# Patient Record
Sex: FEMALE | Race: WHITE | NOT HISPANIC OR LATINO | Employment: OTHER | URBAN - METROPOLITAN AREA
[De-identification: names, ages, dates, MRNs, and addresses within clinical notes are randomized per-mention and may not be internally consistent; named-entity substitution may affect disease eponyms.]

---

## 2018-05-25 ENCOUNTER — APPOINTMENT (EMERGENCY)
Dept: CT IMAGING | Facility: HOSPITAL | Age: 68
End: 2018-05-25
Payer: MEDICARE

## 2018-05-25 ENCOUNTER — HOSPITAL ENCOUNTER (EMERGENCY)
Facility: HOSPITAL | Age: 68
Discharge: LEFT BEFORE DISCHARGE | End: 2018-05-25
Attending: EMERGENCY MEDICINE
Payer: MEDICARE

## 2018-05-25 ENCOUNTER — APPOINTMENT (EMERGENCY)
Dept: RADIOLOGY | Facility: HOSPITAL | Age: 68
End: 2018-05-25
Payer: MEDICARE

## 2018-05-25 VITALS
OXYGEN SATURATION: 98 % | HEART RATE: 82 BPM | SYSTOLIC BLOOD PRESSURE: 114 MMHG | WEIGHT: 165 LBS | HEIGHT: 64 IN | RESPIRATION RATE: 16 BRPM | DIASTOLIC BLOOD PRESSURE: 56 MMHG | BODY MASS INDEX: 28.17 KG/M2 | TEMPERATURE: 97.9 F

## 2018-05-25 DIAGNOSIS — R55 SYNCOPE AND COLLAPSE: Primary | ICD-10-CM

## 2018-05-25 DIAGNOSIS — R42 DIZZINESS: ICD-10-CM

## 2018-05-25 DIAGNOSIS — N17.9 AKI (ACUTE KIDNEY INJURY) (HCC): ICD-10-CM

## 2018-05-25 LAB
ALBUMIN SERPL BCP-MCNC: 3.8 G/DL (ref 3.5–5)
ALP SERPL-CCNC: 51 U/L (ref 46–116)
ALT SERPL W P-5'-P-CCNC: 25 U/L (ref 12–78)
ANION GAP SERPL CALCULATED.3IONS-SCNC: 9 MMOL/L (ref 4–13)
APTT PPP: 31 SECONDS (ref 24–36)
AST SERPL W P-5'-P-CCNC: 17 U/L (ref 5–45)
ATRIAL RATE: 89 BPM
BASOPHILS # BLD AUTO: 0.05 THOUSANDS/ΜL (ref 0–0.1)
BASOPHILS NFR BLD AUTO: 1 % (ref 0–1)
BILIRUB SERPL-MCNC: 0.9 MG/DL (ref 0.2–1)
BUN SERPL-MCNC: 19 MG/DL (ref 5–25)
CALCIUM SERPL-MCNC: 9.8 MG/DL (ref 8.3–10.1)
CHLORIDE SERPL-SCNC: 102 MMOL/L (ref 100–108)
CO2 SERPL-SCNC: 29 MMOL/L (ref 21–32)
CREAT SERPL-MCNC: 1.34 MG/DL (ref 0.6–1.3)
EOSINOPHIL # BLD AUTO: 0.12 THOUSAND/ΜL (ref 0–0.61)
EOSINOPHIL NFR BLD AUTO: 2 % (ref 0–6)
ERYTHROCYTE [DISTWIDTH] IN BLOOD BY AUTOMATED COUNT: 13.2 % (ref 11.6–15.1)
GFR SERPL CREATININE-BSD FRML MDRD: 41 ML/MIN/1.73SQ M
GLUCOSE SERPL-MCNC: 117 MG/DL (ref 65–140)
HCT VFR BLD AUTO: 42.4 % (ref 34.8–46.1)
HGB BLD-MCNC: 13.7 G/DL (ref 11.5–15.4)
IMM GRANULOCYTES # BLD AUTO: 0.02 THOUSAND/UL (ref 0–0.2)
IMM GRANULOCYTES NFR BLD AUTO: 0 % (ref 0–2)
INR PPP: 1.04 (ref 0.86–1.17)
LYMPHOCYTES # BLD AUTO: 0.84 THOUSANDS/ΜL (ref 0.6–4.47)
LYMPHOCYTES NFR BLD AUTO: 13 % (ref 14–44)
MAGNESIUM SERPL-MCNC: 2 MG/DL (ref 1.6–2.6)
MCH RBC QN AUTO: 27.7 PG (ref 26.8–34.3)
MCHC RBC AUTO-ENTMCNC: 32.3 G/DL (ref 31.4–37.4)
MCV RBC AUTO: 86 FL (ref 82–98)
MONOCYTES # BLD AUTO: 0.63 THOUSAND/ΜL (ref 0.17–1.22)
MONOCYTES NFR BLD AUTO: 9 % (ref 4–12)
NEUTROPHILS # BLD AUTO: 5.04 THOUSANDS/ΜL (ref 1.85–7.62)
NEUTS SEG NFR BLD AUTO: 75 % (ref 43–75)
NRBC BLD AUTO-RTO: 0 /100 WBCS
P AXIS: 74 DEGREES
PLATELET # BLD AUTO: 262 THOUSANDS/UL (ref 149–390)
PMV BLD AUTO: 9.4 FL (ref 8.9–12.7)
POTASSIUM SERPL-SCNC: 3.7 MMOL/L (ref 3.5–5.3)
PR INTERVAL: 168 MS
PROT SERPL-MCNC: 7.2 G/DL (ref 6.4–8.2)
PROTHROMBIN TIME: 13.5 SECONDS (ref 11.8–14.2)
QRS AXIS: -53 DEGREES
QRSD INTERVAL: 96 MS
QT INTERVAL: 382 MS
QTC INTERVAL: 464 MS
RBC # BLD AUTO: 4.95 MILLION/UL (ref 3.81–5.12)
SODIUM SERPL-SCNC: 140 MMOL/L (ref 136–145)
T WAVE AXIS: 62 DEGREES
TROPONIN I SERPL-MCNC: <0.02 NG/ML
VENTRICULAR RATE: 89 BPM
WBC # BLD AUTO: 6.7 THOUSAND/UL (ref 4.31–10.16)

## 2018-05-25 PROCEDURE — 85610 PROTHROMBIN TIME: CPT | Performed by: EMERGENCY MEDICINE

## 2018-05-25 PROCEDURE — 99285 EMERGENCY DEPT VISIT HI MDM: CPT

## 2018-05-25 PROCEDURE — 70450 CT HEAD/BRAIN W/O DYE: CPT

## 2018-05-25 PROCEDURE — 36415 COLL VENOUS BLD VENIPUNCTURE: CPT | Performed by: EMERGENCY MEDICINE

## 2018-05-25 PROCEDURE — 85025 COMPLETE CBC W/AUTO DIFF WBC: CPT | Performed by: EMERGENCY MEDICINE

## 2018-05-25 PROCEDURE — 96360 HYDRATION IV INFUSION INIT: CPT

## 2018-05-25 PROCEDURE — 71046 X-RAY EXAM CHEST 2 VIEWS: CPT

## 2018-05-25 PROCEDURE — 83735 ASSAY OF MAGNESIUM: CPT | Performed by: EMERGENCY MEDICINE

## 2018-05-25 PROCEDURE — 80053 COMPREHEN METABOLIC PANEL: CPT | Performed by: EMERGENCY MEDICINE

## 2018-05-25 PROCEDURE — 85730 THROMBOPLASTIN TIME PARTIAL: CPT | Performed by: EMERGENCY MEDICINE

## 2018-05-25 PROCEDURE — 84484 ASSAY OF TROPONIN QUANT: CPT | Performed by: EMERGENCY MEDICINE

## 2018-05-25 PROCEDURE — 93010 ELECTROCARDIOGRAM REPORT: CPT | Performed by: INTERNAL MEDICINE

## 2018-05-25 PROCEDURE — 93005 ELECTROCARDIOGRAM TRACING: CPT

## 2018-05-25 RX ADMIN — SODIUM CHLORIDE 1000 ML: 0.9 INJECTION, SOLUTION INTRAVENOUS at 16:00

## 2018-05-25 NOTE — ED PROVIDER NOTES
History  Chief Complaint   Patient presents with    Syncope     Pt with episode of fainting earlier today, states her BP was low this morning at her PCP  Pt does think she hit the back of her head and is on blood thinners      Patient is a 42-year-old female with past medical history of prior stroke that left her with residual diplopia in 2003, FMD (facioscapulohumeral muscular dystrophy), hypertension, presents to the emergency department after she had a syncopal episode this afternoon around 12:30-1PM   Patient reports for several months she has been having dizzy spells which she describes as an off-balance feeling  She was seen by her neurologist today for a follow-up visit  This afternoon she ate lunch, had 1 glass of sangria and then when gross 3 shopping with her   They were in the checkout line and patient reports feeling dizzy both as a lightheaded feeling and an off-balance feeling and then syncopized   reports hearing a loud thud on the ground and found her on the floor lying on her back  By the time he came to her, her eyes were open and she was responding so her loss of consciousness could not be more than several seconds  She denies ever having a syncopal episode before  She states she still feels mildly dizzy and not quite back to baseline  She denies any headache, visual disturbance worse than baseline, tinnitus or change in hearing, neck pain or back pain, chest pain, palpitations, dyspnea, diaphoresis, abdominal pain, nausea, vomiting, diarrhea, constipation, blood per rectum or melena, recent dysuria, change in urinary frequency, hematuria, flank pain, skin rash or color change, leg pain or swelling, lateralizing extremity weakness or paresthesia, slurring of speech, difficulty getting words out, facial asymmetry or other focal neurologic deficits  She does follow with a cardiologist and reports she has an aneurysm" that she thinks is in her aorta    She denies prior dysrhythmia or MI  Patient reports several months ago she had a CT scan of her brain as part of the dizziness workup which was apparently normal  Her neurologist reports he is not sure what is causing her dizziness  Patient does note that recently her blood pressure has been running low around  systolic  She states normally her blood pressure is around 078-657 systolic  Her family doctor recently switched her blood pressure medications about 3-4 weeks ago and since then it has been running slightly lower than normal         History provided by:  Patient   used: No        None       Past Medical History:   Diagnosis Date    Cataracts, both eyes     FMD (facioscapulohumeral muscular dystrophy) ([facility] [address] [facility]     Stroke ([facility] )     2003       Past Surgical History:   Procedure Laterality Date    BACK SURGERY      TUBAL LIGATION         History reviewed  No pertinent family history  I have reviewed and agree with the history as documented  Social History   Substance Use Topics    Smoking status: Never Smoker    Smokeless tobacco: Never Used    Alcohol use No        Review of Systems   Constitutional: Negative for chills, diaphoresis, fatigue and fever  HENT: Negative for congestion, ear pain, hearing loss, rhinorrhea, sore throat and tinnitus  Eyes: Negative for photophobia, pain and visual disturbance  Respiratory: Negative for cough, chest tightness, shortness of breath and wheezing  Cardiovascular: Negative for chest pain, palpitations and leg swelling  Gastrointestinal: Negative for abdominal distention, abdominal pain, blood in stool, constipation, diarrhea, nausea and vomiting  Genitourinary: Negative for dysuria, flank pain, frequency and hematuria  Musculoskeletal: Negative for back pain, neck pain and neck stiffness  Skin: Negative for color change, pallor, rash and wound  Allergic/Immunologic: Negative for immunocompromised state     Neurological: Positive for dizziness, syncope and light-headedness  Negative for seizures, facial asymmetry, speech difficulty, weakness, numbness and headaches  Hematological: Negative for adenopathy  Does not bruise/bleed easily  Psychiatric/Behavioral: Negative for confusion and decreased concentration  All other systems reviewed and are negative  Physical Exam  Physical Exam   Constitutional: She is oriented to person, place, and time  She appears well-developed and well-nourished  No distress  HENT:   Head: Normocephalic and atraumatic  Right Ear: External ear normal    Left Ear: External ear normal    Mouth/Throat: Oropharynx is clear and moist  No oropharyngeal exudate  No scalp hematoma or laceration  Eyes: Conjunctivae and EOM are normal  Pupils are equal, round, and reactive to light  Neck: Normal range of motion  Neck supple  No JVD present  Cardiovascular: Normal rate, regular rhythm, normal heart sounds and intact distal pulses  Exam reveals no gallop and no friction rub  No murmur heard  Pulmonary/Chest: Effort normal and breath sounds normal  No respiratory distress  She has no wheezes  She has no rales  She exhibits no tenderness  Abdominal: Soft  Bowel sounds are normal  She exhibits no distension  There is no tenderness  There is no rebound and no guarding  Musculoskeletal: Normal range of motion  She exhibits no edema or tenderness  No midline C/T/L-spine tenderness  No step-offs  Lymphadenopathy:     She has no cervical adenopathy  Neurological: She is alert and oriented to person, place, and time  No cranial nerve deficit  No gross motor or sensory deficits  5/5 strength throughout  Normal finger-to-nose exam   Normal speech  Skin: Skin is warm and dry  No rash noted  She is not diaphoretic  No erythema  No pallor  Psychiatric: She has a normal mood and affect  Her behavior is normal    Nursing note and vitals reviewed        Vital Signs  ED Triage Vitals [05/25/18 1522] Temperature Pulse Respirations Blood Pressure SpO2   97 9 °F (36 6 °C) 95 16 109/65 98 %      Temp Source Heart Rate Source Patient Position - Orthostatic VS BP Location FiO2 (%)   Oral Monitor Sitting Right arm --      Pain Score       No Pain           Vitals:    05/25/18 1522 05/25/18 1607 05/25/18 1608 05/25/18 1700   BP: 109/65 103/55 113/60 114/56   Pulse: 95  90 82   Patient Position - Orthostatic VS: Sitting Lying Sitting Lying     Vitals:    05/25/18 1522 05/25/18 1607 05/25/18 1608 05/25/18 1700   BP: 109/65 103/55 113/60 114/56   BP Location: Right arm  Left arm Right arm   Pulse: 95  90 82   Resp: 16   16   Temp: 97 9 °F (36 6 °C)      TempSrc: Oral      SpO2: 98%   98%   Weight: 74 8 kg (165 lb)      Height: 5' 4" (1 626 m)          Visual Acuity      ED Medications  Medications   sodium chloride 0 9 % bolus 1,000 mL (1,000 mL Intravenous New Bag 5/25/18 1600)       Diagnostic Studies  Results Reviewed     Procedure Component Value Units Date/Time    Troponin I [[id number]]  (Normal) Collected:  05/25/18 1559    Lab Status:  Final result Specimen:  Blood from Arm, Right Updated:  05/25/18 1632     Troponin I <0 02 ng/mL     Narrative:         [employer] Chemistry analyzer 99% cutoff is > 0 04 ng/mL in network labs    o cTnI 99% cutoff is useful only when applied to patients in the clinical setting of myocardial ischemia  o cTnI 99% cutoff should be interpreted in the context of clinical history, ECG findings and possibly cardiac imaging to establish correct diagnosis  o cTnI 99% cutoff may be suggestive but clearly not indicative of a coronary event without the clinical setting of myocardial ischemia      Ana Screen [[id number]]  (Normal) Collected:  05/25/18 1559    Lab Status:  Final result Specimen:  Blood from Arm, Right Updated:  05/25/18 1629     Protime 13 5 seconds      INR 1 04    APTT [[id number]]  (Normal) Collected:  05/25/18 1559    Lab Status:  Final result Specimen:  Blood from Arm, Right Updated:  05/25/18 1629     PTT 31 seconds     Comprehensive metabolic panel [[id number]]  (Abnormal) Collected:  05/25/18 1559    Lab Status:  Final result Specimen:  Blood from Arm, Right Updated:  05/25/18 1628     Sodium 140 mmol/L      Potassium 3 7 mmol/L      Chloride 102 mmol/L      CO2 29 mmol/L      Anion Gap 9 mmol/L      BUN 19 mg/dL      Creatinine 1 34 (H) mg/dL      Glucose 117 mg/dL      Calcium 9 8 mg/dL      AST 17 U/L      ALT 25 U/L      Alkaline Phosphatase 51 U/L      Total Protein 7 2 g/dL      Albumin 3 8 g/dL      Total Bilirubin 0 90 mg/dL      eGFR 41 ml/min/1 73sq m     Narrative:         National Kidney Disease Education Program recommendations are as follows:  GFR calculation is accurate only with a steady state creatinine  Chronic Kidney disease less than 60 ml/min/1 73 sq  meters  Kidney failure less than 15 ml/min/1 73 sq  meters      Magnesium [[id number]]  (Normal) Collected:  05/25/18 1559    Lab Status:  Final result Specimen:  Blood from Arm, Right Updated:  05/25/18 1628     Magnesium 2 0 mg/dL     CBC and differential [[id number]]  (Abnormal) Collected:  05/25/18 1559    Lab Status:  Final result Specimen:  Blood from Arm, Right Updated:  05/25/18 1605     WBC 6 70 Thousand/uL      RBC 4 95 Million/uL      Hemoglobin 13 7 g/dL      Hematocrit 42 4 %      MCV 86 fL      MCH 27 7 pg      MCHC 32 3 g/dL      RDW 13 2 %      MPV 9 4 fL      Platelets 888 Thousands/uL      nRBC 0 /100 WBCs      Neutrophils Relative 75 %      Immat GRANS % 0 %      Lymphocytes Relative 13 (L) %      Monocytes Relative 9 %      Eosinophils Relative 2 %      Basophils Relative 1 %      Neutrophils Absolute 5 04 Thousands/µL      Immature Grans Absolute 0 02 Thousand/uL      Lymphocytes Absolute 0 84 Thousands/µL      Monocytes Absolute 0 63 Thousand/µL      Eosinophils Absolute 0 12 Thousand/µL      Basophils Absolute 0 05 Thousands/µL                  XR chest 2 views   ED Interpretation by Comfort Sandoval, DO (05/25 1713)   No acute abnormality in the chest       CT head without contrast   Final Result by Damaris Carrillo DO (05/25 1601)   Cerebral atrophy with chronic small vessel ischemic change  No acute intracranial abnormality  Workstation performed: [id number]                    Procedures  ECG 12 Lead Documentation  Date/Time: 5/25/2018 4:12 PM  Performed by: Marco Herrera by: Thuy Lott     Indications / Diagnosis:  Syncope  ECG reviewed by me, the ED Provider: yes    Patient location:  ED  Previous ECG:     Previous ECG:  Unavailable  Rate:     ECG rate:  89    ECG rate assessment: normal    Rhythm:     Rhythm: sinus rhythm    Ectopy:     Ectopy: none    QRS:     QRS axis:  Left    QRS intervals:  Normal  Conduction:     Conduction: normal    ST segments:     ST segments:  Non-specific  T waves:     T waves: non-specific    Comments:      Low voltage QRS complex  Phone Contacts  ED Phone Contact    ED Course  ED Course as of May 25 1714   Fri May 25, 2018   1608 Patient's records through [facility]  Unable to visualize prior EKG  Last Echo was on 4/22/16 which showed "Estimated left ventricular ejection fraction is 55%  Normal left ventricular systolic function  Dilated aortic root (3 8cm)  Ascending aorta borderline enlarged (3 9cm)  Mild aortic regurgitation  Mild mitral regurgitation   "    1610 Stress Echo from [facility] was on 7/25/2017 which showed: "Negative exercise stress echocardiography for ischemia      Negative exercise ECG for ischemia      Normal exercise capacity for age and gender " Carotid duplex US on 7/25/17 showed "Right Carotid: There is no significant atherosclerotic plaque noted in the right internal     carotid artery      The right internal carotid artery has a 0-19% stenosis        Left Carotid:    There is heterogeneous, irregular atherosclerotic plaque noted in the left     internal carotid artery      The left internal carotid artery has a 0-19% stenosis         Subclavian:    Biphasic subclavian waveform noted bilaterally  "    1616 Hemoglobin: 13 7   1630 Creatinine: (!) 1 34   1630 According to records from [facility], most recent labs showed normal renal function (GFR >60 and creatinint <1) from March 2018 and prior  This JENNI is new  eGFR: 41   1653 Updated patient about workup thus far of acute kidney injury and nonspecific EKG  Recommended that she be admitted given her risk factors of possibly low blood pressure as well as history of stroke  Discussed potential risk of dysrhythmia or cardiac abnormality that we cannot diagnose in the ED  Patient wishes to be discharged  Discussed risk of worsening condition or possibly death in patient still wishes for discharge and is agreeable to signing an [place] form  1658 Orthostatic vital signs     1606 -Laying     (BP) 103/55  (HR)  86  1607- Sitting     (BP) 113/60  (HR)  91  1608- Standing (BP) 108/56  (HR)  91     Pt did not complain of light headed or dizzy during test                                 MDM  Number of Diagnoses or Management Options  Diagnosis management comments: 70-year-old female presents after syncopal episode  Differential includes orthostatic hypotension, dysrhythmia, dehydration, vasovagal syncope, neurologic etiology  Will obtain CT scan of the head given that she fell and hit her head and is on Plavix  Patient has no midline cervical spine tenderness and cervical spine cleared via NEXUS criteria  Will check cardiac labs, chest x-ray and orthostatic vital signs  Will hydrate with 1 L IV fluid bolus  Most likely will recommend admission for observation and telemetry monitoring  Patient does express her wished to be discharged home but will readdress this after workup complete         Amount and/or Complexity of Data Reviewed  Clinical lab tests: ordered and reviewed  Tests in the radiology section of CPT®: ordered and reviewed  Tests in the medicine section of CPT®: ordered and reviewed  Independent visualization of images, tracings, or specimens: yes      CritCare Time    Disposition  Final diagnoses:   Syncope and collapse   Dizziness   JENNI (acute kidney injury) ([facility] [address] )     Time reflects when diagnosis was documented in both MDM as applicable and the Disposition within this note     Time User Action Codes Description Comment    5/25/2018  3:52 PM Byron Thomas E Add [R55] Syncope and collapse     5/25/2018  3:52 PM Byron Thomas E Add [R42] Dizziness     5/25/2018  5:06 PM Byron Thomas E Add [N17 9] JENNI (acute kidney injury) [facility])       ED Disposition     ED Disposition Condition Comment    AMA  Date: 5/25/2018  Patient: Lydia Mclean  Admitted: 5/25/2018  3:21 PM  Attending Provider: Michelet Hamm DO    Lydia Mclean or her authorized caregiver has made the decision for the patient to leave the emergency department against the advice o f her attending physician  She or her authorized caregiver has been informed and understands the inherent risks, including recurrent syncope, worsening kidney function, arrhythmia or other cardiac event, death  She or her authorized caregiver has decide d to accept the responsibility for this decision  Lydia Mclean and all necessary parties have been advised that she may return for further evaluation or treatment  Her condition at time of discharge was stable    Lydia Mclean had current vital sig ns as follows:  /56 (BP Location: Right arm)   Pulse 82   Temp 97 9 °F (36 6 °C) (Oral)   Resp 16   Ht 5' 4" (1 626 m)   Wt 74 8 kg (165 lb)         Follow-up Information     Follow up With Specialties Details Why Contact Info Additional Information    Azar Cannon MD Family Medicine Schedule an appointment as soon as possible for a visit  [id number] [facility]  [place] Olivares [address] [place] [address]  [phone]       Cardiologist  Schedule an appointment as soon as possible for a visit       [facility] [place] Emergency Department Emergency Medicine Go to If symptoms worsen [address] [facility] [address]  [phone] [place] ED, [address] [place], [place], [address]          Patient's Medications    No medications on file     No discharge procedures on file      ED Provider  Electronically Signed by           Surya Upton DO  05/25/18 1500

## 2018-05-25 NOTE — ED NOTES
Discharge instructions reviewed  Pt with no questions or concerns at this time  Pt ambulatory off unit with steady gait        Bijal Park RN  05/25/18 1896

## 2018-05-25 NOTE — ED NOTES
Orthostatic vital signs      1606 -Laying     (BP) 103/55  (HR)  86  1607- Sitting     (BP) 113/60  (HR)  91  1608- Standing (BP) 108/56  (HR)  91    Pt did not complain of light headed or dizzy during test      Iwona Hernandez  05/25/18 4692

## 2018-05-25 NOTE — DISCHARGE INSTRUCTIONS
Syncope   WHAT YOU NEED TO KNOW:   Syncope is also called fainting or passing out  Syncope is a sudden, temporary loss of consciousness, followed by a fall from a standing or sitting position  Syncope ranges from not serious to a sign of a more serious condition that needs to be treated  You can control some health conditions that cause syncope  Your healthcare providers can help you create a plan to manage syncope and prevent episodes  DISCHARGE INSTRUCTIONS:   Seek care immediately if:   · You are bleeding because you hit your head when you fainted  · You suddenly have double vision, difficulty speaking, numbness, and cannot move your arms or legs  · You have chest pain and trouble breathing  · You vomit blood or material that looks like coffee grounds  · You see blood in your bowel movement  Contact your healthcare provider if:   · You have new or worsening symptoms  · You have another syncope episode  · You have a headache, fast heartbeat, or feel too dizzy to stand up  · You have questions or concerns about your condition or care  Follow up with your healthcare provider as directed:  Write down your questions so you remember to ask them during your visits  Manage syncope:   · Keep a record of your syncope episodes  Include your symptoms and your activity before and after the episode  The record can help your healthcare provider find the cause of your syncope and help you manage episodes  · Sit or lie down when needed  This includes when you feel dizzy, your throat is getting tight, and your vision changes  Raise your legs above the level of your heart  · Take slow, deep breaths if you start to breathe faster with anxiety or fear  This can help decrease dizziness and the feeling that you might faint  · Check your blood pressure often  This is important if you take medicine to lower your blood pressure   Check your blood pressure when you are lying down and when you are standing  Ask how often to check during the day  Keep a record of your blood pressure numbers  Your healthcare provider may use the record to help plan your treatment  Prevent a syncope episode:   · Move slowly and let yourself get used to one position before you move to another position  This is very important when you change from a lying or sitting position to a standing position  Take some deep breaths before you stand up from a lying position  Stand up slowly  Sudden movements may cause a fainting spell  Sit on the side of the bed or couch for a few minutes before you stand up  If you are on bedrest, try to be upright for about 2 hours each day, or as directed  Do not lock your legs if you are standing for a long period of time  Move your legs and bend your knees to keep blood flowing  · Follow your healthcare provider's recommendations  Your provider may  recommend that you drink more liquids to prevent dehydration  You may also need to have more salt to keep your blood pressure from dropping too low and causing syncope  Your provider will tell you how much liquid and sodium to have each day  · Watch for signs of low blood sugar  These include hunger, nervousness, sweating, and fast or fluttery heartbeats  Talk with your healthcare provider about ways to keep your blood sugar level steady  · Do not strain if you are constipated  You may faint if you strain to have a bowel movement  Walking is the best way to get your bowels moving  Eat foods high in fiber to make it easier to have a bowel movement  Good examples are high-fiber cereals, beans, vegetables, and whole-grain breads  Prune juice may help make bowel movements softer  · Be careful in hot weather  Heat can cause a syncope episode  Limit activity done outside on hot days  Physical activity in hot weather can lead to dehydration  This can cause an episode    © 2017 [employer] Information is for End User's use only and may not be sold, redistributed or otherwise used for commercial purposes  All illustrations and images included in CareNotes® are the copyrighted property of [employer]  or [employer]  The above information is an  only  It is not intended as medical advice for individual conditions or treatments  Talk to your doctor, nurse or pharmacist before following any medical regimen to see if it is safe and effective for you  Dizziness   WHAT YOU NEED TO KNOW:   Dizziness is a feeling of being off balance or unsteady  Common causes of dizziness are an inner ear fluid imbalance or a lack of oxygen in your blood  Dizziness may be acute (lasts 3 days or less) or chronic (lasts longer than 3 days)  You may have dizzy spells that last from seconds to a few hours  DISCHARGE INSTRUCTIONS:   Return to the emergency department if:   · You have a headache and a stiff neck  · You have shaking chills and a fever  · You vomit over and over with no relief  · Your vomit or bowel movements are red or black  · You have pain in your chest, back, or abdomen  · You have numbness, especially in your face, arms, or legs  · You have trouble moving your arms or legs  · You are confused  Contact your healthcare provider if:   · You have a fever  · Your symptoms do not get better with treatment  · You have questions or concerns about your condition or care  Manage your symptoms:   · Do not drive  or operate heavy machinery when you are dizzy  · Get up slowly  from sitting or lying down  · Drink plenty of liquids  Liquids help prevent dehydration  Ask how much liquid to drink each day and which liquids are best for you  Follow up with your healthcare provider as directed:  Write down your questions so you remember to ask them during your visits     © 2017 [employer] Information is for End User's use only and may not be sold, redistributed or otherwise used for commercial purposes  All illustrations and images included in CareNotes® are the copyrighted property of [employer]  or [employer]  The above information is an  only  It is not intended as medical advice for individual conditions or treatments  Talk to your doctor, nurse or pharmacist before following any medical regimen to see if it is safe and effective for you  Acute Kidney Injury   WHAT YOU NEED TO KNOW:   Acute kidney injury (JENNI) is also called acute kidney failure, or acute renal failure  JENNI happens when your kidneys suddenly stop working correctly  Normally, the kidneys remove fluid, chemicals, and waste from your blood  These wastes are turned into urine by your kidneys  JENNI usually happens over hours or days  When you have JENNI, your kidneys do not remove the waste, chemicals, or extra fluid from your body  A normal amount of urine is not produced  JENNI is usually temporary, it can take days to months to recover  JENNI can also become a chronic kidney condition  DISCHARGE INSTRUCTIONS:   Call 911 if:   · You have sudden chest pain or trouble breathing  Seek care immediately if:   · Your symptoms get worse  Contact your healthcare provider if:   · Your symptoms return  · Your blood sugar or blood pressure level is not within the range your healthcare provider recommends  · You have questions or concerns about your condition or care  Nutrition:  Your healthcare provider may tell you to eat food low in sodium (salt), potassium, phosphorus, or protein  A dietitian can help you plan your meals  Drink liquids as directed: Your healthcare provider may recommend that you drink a certain amount of liquids  This will help your kidneys work better and decrease your risk for dehydration  Ask how much liquid to drink each day and which liquids are best for you    Prevent acute kidney injury:   · Manage other health conditions  such as diabetes, high blood pressure, or heart disease  These conditions increase your risk for acute kidney injury  Take your medicines for these conditions as directed  Also, monitor your blood sugar and blood pressure levels as directed  Contact your healthcare provider if your levels are not in the range he or she says it should be  · Talk to your healthcare provider before you take over-the-counter-medicine  NSAIDs, stomach medicine, or laxatives may harm your kidneys and increase your risk for acute kidney injury  If it is okay to take the medicine, follow the directions on the package  Do not take more than directed  · Tell healthcare providers you have had acute kidney injury  before you get contrast liquid for an x-ray or CT scan  Your healthcare provider may give you medicine to prevent kidney problems caused by the liquid  Follow up with your healthcare provider as directed: You will need to return for more tests to make sure your kidneys are working properly  You may also be referred to a kidney specialist  Write down your questions so you remember to ask them during your visits  © 2017 [employer]  Information is for End User's use only and may not be sold, redistributed or otherwise used for commercial purposes  All illustrations and images included in CareNotes® are the copyrighted property of [employer]  or [employer]  The above information is an  only  It is not intended as medical advice for individual conditions or treatments  Talk to your doctor, nurse or pharmacist before following any medical regimen to see if it is safe and effective for you